# Patient Record
Sex: FEMALE | URBAN - METROPOLITAN AREA
[De-identification: names, ages, dates, MRNs, and addresses within clinical notes are randomized per-mention and may not be internally consistent; named-entity substitution may affect disease eponyms.]

---

## 2017-07-19 ENCOUNTER — APPOINTMENT (OUTPATIENT)
Dept: ULTRASOUND IMAGING | Facility: CLINIC | Age: 27
End: 2017-07-19
Attending: PHYSICIAN ASSISTANT
Payer: COMMERCIAL

## 2017-07-19 ENCOUNTER — HOSPITAL ENCOUNTER (EMERGENCY)
Facility: CLINIC | Age: 27
Discharge: HOME | End: 2017-07-19
Attending: EMERGENCY MEDICINE | Admitting: EMERGENCY MEDICINE
Payer: COMMERCIAL

## 2017-07-19 VITALS
HEART RATE: 64 BPM | RESPIRATION RATE: 18 BRPM | OXYGEN SATURATION: 100 % | DIASTOLIC BLOOD PRESSURE: 56 MMHG | TEMPERATURE: 98.1 F | SYSTOLIC BLOOD PRESSURE: 116 MMHG

## 2017-07-19 DIAGNOSIS — M54.41 ACUTE MIDLINE LOW BACK PAIN WITH RIGHT-SIDED SCIATICA: ICD-10-CM

## 2017-07-19 DIAGNOSIS — R30.0 DYSURIA: ICD-10-CM

## 2017-07-19 DIAGNOSIS — N92.6 IRREGULAR UTERINE BLEEDING: ICD-10-CM

## 2017-07-19 DIAGNOSIS — N83.209 SIMPLE OVARIAN CYST: ICD-10-CM

## 2017-07-19 LAB
ALBUMIN SERPL-MCNC: 4 G/DL (ref 3.4–5)
ALBUMIN UR-MCNC: NEGATIVE MG/DL
ALP SERPL-CCNC: 55 U/L (ref 40–150)
ALT SERPL W P-5'-P-CCNC: 33 U/L (ref 0–50)
ANION GAP SERPL CALCULATED.3IONS-SCNC: 5 MMOL/L (ref 3–14)
APPEARANCE UR: CLEAR
AST SERPL W P-5'-P-CCNC: 18 U/L (ref 0–45)
BASOPHILS # BLD AUTO: 0 10E9/L (ref 0–0.2)
BASOPHILS NFR BLD AUTO: 0.4 %
BILIRUB SERPL-MCNC: 0.3 MG/DL (ref 0.2–1.3)
BILIRUB UR QL STRIP: NEGATIVE
BUN SERPL-MCNC: 9 MG/DL (ref 7–30)
CALCIUM SERPL-MCNC: 9 MG/DL (ref 8.5–10.1)
CHLORIDE SERPL-SCNC: 109 MMOL/L (ref 94–109)
CO2 SERPL-SCNC: 27 MMOL/L (ref 20–32)
COLOR UR AUTO: NORMAL
CREAT SERPL-MCNC: 0.65 MG/DL (ref 0.52–1.04)
DIFFERENTIAL METHOD BLD: ABNORMAL
EOSINOPHIL # BLD AUTO: 0.5 10E9/L (ref 0–0.7)
EOSINOPHIL NFR BLD AUTO: 6.4 %
ERYTHROCYTE [DISTWIDTH] IN BLOOD BY AUTOMATED COUNT: 14.5 % (ref 10–15)
GFR SERPL CREATININE-BSD FRML MDRD: NORMAL ML/MIN/1.7M2
GLUCOSE SERPL-MCNC: 81 MG/DL (ref 70–99)
GLUCOSE UR STRIP-MCNC: NEGATIVE MG/DL
HCG UR QL: NEGATIVE
HCT VFR BLD AUTO: 37.8 % (ref 35–47)
HGB BLD-MCNC: 11.9 G/DL (ref 11.7–15.7)
HGB UR QL STRIP: NEGATIVE
IMM GRANULOCYTES # BLD: 0 10E9/L (ref 0–0.4)
IMM GRANULOCYTES NFR BLD: 0.3 %
KETONES UR STRIP-MCNC: NEGATIVE MG/DL
LEUKOCYTE ESTERASE UR QL STRIP: NEGATIVE
LYMPHOCYTES # BLD AUTO: 2.4 10E9/L (ref 0.8–5.3)
LYMPHOCYTES NFR BLD AUTO: 33.9 %
MCH RBC QN AUTO: 25.3 PG (ref 26.5–33)
MCHC RBC AUTO-ENTMCNC: 31.5 G/DL (ref 31.5–36.5)
MCV RBC AUTO: 80 FL (ref 78–100)
MICRO REPORT STATUS: NORMAL
MONOCYTES # BLD AUTO: 0.5 10E9/L (ref 0–1.3)
MONOCYTES NFR BLD AUTO: 7 %
NEUTROPHILS # BLD AUTO: 3.6 10E9/L (ref 1.6–8.3)
NEUTROPHILS NFR BLD AUTO: 52 %
NITRATE UR QL: NEGATIVE
NRBC # BLD AUTO: 0 10*3/UL
NRBC BLD AUTO-RTO: 0 /100
PH UR STRIP: 7 PH (ref 5–7)
PLATELET # BLD AUTO: 319 10E9/L (ref 150–450)
POTASSIUM SERPL-SCNC: 3.7 MMOL/L (ref 3.4–5.3)
PROT SERPL-MCNC: 8.1 G/DL (ref 6.8–8.8)
RBC # BLD AUTO: 4.71 10E12/L (ref 3.8–5.2)
RBC #/AREA URNS AUTO: <1 /HPF (ref 0–2)
SODIUM SERPL-SCNC: 141 MMOL/L (ref 133–144)
SP GR UR STRIP: 1.01 (ref 1–1.03)
SPECIMEN SOURCE: NORMAL
SQUAMOUS #/AREA URNS AUTO: <1 /HPF (ref 0–1)
TSH SERPL DL<=0.005 MIU/L-ACNC: 0.84 MU/L (ref 0.4–4)
URN SPEC COLLECT METH UR: NORMAL
UROBILINOGEN UR STRIP-MCNC: 0 MG/DL (ref 0–2)
WBC # BLD AUTO: 7 10E9/L (ref 4–11)
WBC #/AREA URNS AUTO: 1 /HPF (ref 0–2)
WET PREP SPEC: NORMAL

## 2017-07-19 PROCEDURE — 81025 URINE PREGNANCY TEST: CPT | Performed by: PHYSICIAN ASSISTANT

## 2017-07-19 PROCEDURE — 87491 CHLMYD TRACH DNA AMP PROBE: CPT | Performed by: PHYSICIAN ASSISTANT

## 2017-07-19 PROCEDURE — 25000128 H RX IP 250 OP 636: Performed by: PHYSICIAN ASSISTANT

## 2017-07-19 PROCEDURE — 81001 URINALYSIS AUTO W/SCOPE: CPT | Performed by: PHYSICIAN ASSISTANT

## 2017-07-19 PROCEDURE — 85025 COMPLETE CBC W/AUTO DIFF WBC: CPT | Performed by: PHYSICIAN ASSISTANT

## 2017-07-19 PROCEDURE — 96374 THER/PROPH/DIAG INJ IV PUSH: CPT

## 2017-07-19 PROCEDURE — 99285 EMERGENCY DEPT VISIT HI MDM: CPT | Mod: 25

## 2017-07-19 PROCEDURE — 80053 COMPREHEN METABOLIC PANEL: CPT | Performed by: PHYSICIAN ASSISTANT

## 2017-07-19 PROCEDURE — 84443 ASSAY THYROID STIM HORMONE: CPT | Performed by: PHYSICIAN ASSISTANT

## 2017-07-19 PROCEDURE — 87210 SMEAR WET MOUNT SALINE/INK: CPT | Performed by: PHYSICIAN ASSISTANT

## 2017-07-19 PROCEDURE — 93976 VASCULAR STUDY: CPT | Mod: XS

## 2017-07-19 PROCEDURE — 96375 TX/PRO/DX INJ NEW DRUG ADDON: CPT

## 2017-07-19 PROCEDURE — 87591 N.GONORRHOEAE DNA AMP PROB: CPT | Performed by: PHYSICIAN ASSISTANT

## 2017-07-19 RX ORDER — KETOROLAC TROMETHAMINE 30 MG/ML
30 INJECTION, SOLUTION INTRAMUSCULAR; INTRAVENOUS ONCE
Status: COMPLETED | OUTPATIENT
Start: 2017-07-19 | End: 2017-07-19

## 2017-07-19 RX ORDER — CYCLOBENZAPRINE HCL 10 MG
10 TABLET ORAL 3 TIMES DAILY PRN
Qty: 20 TABLET | Refills: 0 | Status: SHIPPED | OUTPATIENT
Start: 2017-07-19 | End: 2017-07-25

## 2017-07-19 RX ORDER — ONDANSETRON 2 MG/ML
4 INJECTION INTRAMUSCULAR; INTRAVENOUS ONCE
Status: COMPLETED | OUTPATIENT
Start: 2017-07-19 | End: 2017-07-19

## 2017-07-19 RX ORDER — METHYLPREDNISOLONE 4 MG
TABLET, DOSE PACK ORAL
Qty: 21 TABLET | Refills: 0 | Status: SHIPPED | OUTPATIENT
Start: 2017-07-19 | End: 2017-07-27

## 2017-07-19 RX ORDER — NAPROXEN 500 MG/1
500 TABLET ORAL 2 TIMES DAILY WITH MEALS
Qty: 20 TABLET | Refills: 0 | Status: SHIPPED | OUTPATIENT
Start: 2017-07-19 | End: 2017-07-29

## 2017-07-19 RX ADMIN — ONDANSETRON 4 MG: 2 INJECTION INTRAMUSCULAR; INTRAVENOUS at 12:50

## 2017-07-19 RX ADMIN — KETOROLAC TROMETHAMINE 30 MG: 30 INJECTION, SOLUTION INTRAMUSCULAR at 14:17

## 2017-07-19 ASSESSMENT — ENCOUNTER SYMPTOMS
DYSURIA: 1
VOMITING: 0
BACK PAIN: 1
ABDOMINAL PAIN: 1
NAUSEA: 1

## 2017-07-19 NOTE — ED NOTES
"Pt presents to ED with c/o back pain for about 3 weeks, was tolerable but now starting to get worse. Pt reports between the 28th of June and July 16th she was also having heavy vaginal bleeding, going through 4 pads a day, which has now subsided. States the pain is in her lower back and when she stands for along time she gets sore into her legs. Reports saw a \"bone doctor\" she \"has a spot on her back\" and recommended an MRI which she has not had. Pt is visiting from [place] and her primary care is in [place], [place]. She was prescribed pain medications by her primary doctor that she takes twice a day, the pain medicine helps for a little bit, but then starts hurting again.   "

## 2017-07-19 NOTE — ED AVS SNAPSHOT
[facility] Emergency Department    [address]    [facility] [address]    Phone:  [phone]    Fax:  [phone]                                       Kendra Vaughn   MRN: [id number]    Department:  [facility] Emergency Department   Date of Visit:  7/19/2017           After Visit Summary Signature Page     I have received my discharge instructions, and my questions have been answered. I have discussed any challenges I see with this plan with the nurse or doctor.    ..........................................................................................................................................  Patient/Patient Representative Signature      ..........................................................................................................................................  Patient Representative Print Name and Relationship to Patient    ..................................................               ................................................  Date                                            Time    ..........................................................................................................................................  Reviewed by Signature/Title    ...................................................              ..............................................  Date                                                            Time

## 2017-07-19 NOTE — DISCHARGE INSTRUCTIONS
Rest, ice to back, naproxen twice daily.   Also start steroid pack to hep with back pain and muscle relaxer as needed.     For the irregular bleeding - ultrasound shows no cause of this today. Follow up with OB GYN/PCP for further evaluation.   You do have two simple ovarian cysts, which can be intermittently painful. Follow up with OB GYN/PCP for further evaluation.      Discharge Instructions  Back Pain  You were seen today for back pain. Back pain can have many causes, but most will get better without surgery or other specific treatment. Sometimes there is a herniated ( slipped ) disc. We don t usually do MRI scans to look for these right away, since most herniated discs will get better on their own with time.  Today, we did not find any evidence that your back pain was caused by a serious condition, such as an infection, fracture, or tumor. However, sometimes symptoms develop over time and cannot be found during an emergency visit, so it is very important that you follow up with your primary doctor.  Return to the Emergency Department if:    You develop a fever with your back pain.     You have weakness or change in sensation in one or both legs.    You lose control of your bowels or bladder, or can t empty your bladder.    Your pain gets much worse.     Follow-up with your doctor:    Unless your pain has completely gone away, please make an appointment with your doctor within one week.  You may need further management of your back pain, such as more pain medication, imaging such as an X-ray or MRI, or physical therapy.    What can I do to help myself?    Remain Active -- People are often afraid that they will hurt their back further or delay recovery by remaining active, but this is one of the best things you can do for your back. In fact, prolonged bed rest is not recommended. Studies have shown that people with low back pain recover faster when they remain active. Movement helps to bring blood flow to the  muscles and relieve muscle spasms as well as preventing loss of muscle strength.    Heat -- Using a heating pad can help with low back pain during the first few weeks. Do not sleep with a heating pad, as you can be burned.     Pain medications - You may take a pain medication such as Tylenol  (acetaminophen), Advil , Nuprin  (ibuprofen) or Aleve  (naproxen).  If you have been given a narcotic such as Vicodin  (hydrocodone with acetaminophen), Percocet  (oxycodone with acetaminophen), codeine, or a muscle relaxant such as Flexeril  (cyclobenzaprine) or Soma  (carisoprodol), do not drive for four hours after you have taken it. If the narcotic contains Tylenol  (acetaminophen), do not take Tylenol  with it. All narcotics will cause constipation, so eat a high fiber diet.   If you were given a prescription for medicine here today, be sure to read all of the information (including the package insert) that comes with your prescription.  This will include important information about the medicine, its side effects, and any warnings that you need to know about.  The pharmacist who fills the prescription can provide more information and answer questions you may have about the medicine.  If you have questions or concerns that the pharmacist cannot address, please call or return to the Emergency Department.

## 2017-07-19 NOTE — ED AVS SNAPSHOT
[facility] Emergency Department    [address]    [facility] [address]    Phone:  [phone]    Fax:  [phone]                                       Kendra Vaughn   MRN: [id number]    Department:  [facility] Emergency Department   Date of Visit:  7/19/2017           Patient Information     Date Of Birth          1990        Your diagnoses for this visit were:     Acute midline low back pain with right-sided sciatica     Dysuria     Irregular uterine bleeding, not bleeding currently     Simple ovarian cyst, bilateral        You were seen by Ronni Morales MD and Jane Win PA-C.      Follow-up Information     Follow up with [facility] Emergency Department.    Specialty:  EMERGENCY MEDICINE    Why:  If symptoms worsen    Contact information:    [address]  [facility] [address] [phone]        Follow up with Other Clinic, Md In 2 days.    Specialty:  Clinic    Contact information:               Discharge Instructions       Rest, ice to back, naproxen twice daily.   Also start steroid pack to hep with back pain and muscle relaxer as needed.     For the irregular bleeding - ultrasound shows no cause of this today. Follow up with OB GYN/PCP for further evaluation.   You do have two simple ovarian cysts, which can be intermittently painful. Follow up with OB GYN/PCP for further evaluation.      Discharge Instructions  Back Pain  You were seen today for back pain. Back pain can have many causes, but most will get better without surgery or other specific treatment. Sometimes there is a herniated ( slipped ) disc. We don t usually do MRI scans to look for these right away, since most herniated discs will get better on their own with time.  Today, we did not find any evidence that your back pain was caused by a serious condition, such as an infection, fracture, or tumor. However, sometimes symptoms develop over time  and cannot be found during an emergency visit, so it is very important that you follow up with your primary doctor.  Return to the Emergency Department if:    You develop a fever with your back pain.     You have weakness or change in sensation in one or both legs.    You lose control of your bowels or bladder, or can t empty your bladder.    Your pain gets much worse.     Follow-up with your doctor:    Unless your pain has completely gone away, please make an appointment with your doctor within one week.  You may need further management of your back pain, such as more pain medication, imaging such as an X-ray or MRI, or physical therapy.    What can I do to help myself?    Remain Active -- People are often afraid that they will hurt their back further or delay recovery by remaining active, but this is one of the best things you can do for your back. In fact, prolonged bed rest is not recommended. Studies have shown that people with low back pain recover faster when they remain active. Movement helps to bring blood flow to the muscles and relieve muscle spasms as well as preventing loss of muscle strength.    Heat -- Using a heating pad can help with low back pain during the first few weeks. Do not sleep with a heating pad, as you can be burned.     Pain medications - You may take a pain medication such as Tylenol  (acetaminophen), Advil , Nuprin  (ibuprofen) or Aleve  (naproxen).  If you have been given a narcotic such as Vicodin  (hydrocodone with acetaminophen), Percocet  (oxycodone with acetaminophen), codeine, or a muscle relaxant such as Flexeril  (cyclobenzaprine) or Soma  (carisoprodol), do not drive for four hours after you have taken it. If the narcotic contains Tylenol  (acetaminophen), do not take Tylenol  with it. All narcotics will cause constipation, so eat a high fiber diet.   If you were given a prescription for medicine here today, be sure to read all of the information (including the package  insert) that comes with your prescription.  This will include important information about the medicine, its side effects, and any warnings that you need to know about.  The pharmacist who fills the prescription can provide more information and answer questions you may have about the medicine.  If you have questions or concerns that the pharmacist cannot address, please call or return to the Emergency Department.         Discharge References/Attachments     BACK PAIN W/ SCIATICA (ENGLISH)    DYSFUNCTIONAL UTERINE BLEEDING (ENGLISH)      24 Hour Appointment Hotline       To make an appointment at any [facility], call [phone] ([phone]). If you don't have a family doctor or clinic, we will help you find one. [facility] clinics are conveniently located to serve the needs of you and your family.             Review of your medicines      START taking        Dose / Directions Last dose taken    cyclobenzaprine 10 MG tablet   Commonly known as:  FLEXERIL   Dose:  10 mg   Quantity:  20 tablet        Take 1 tablet (10 mg) by mouth 3 times daily as needed for muscle spasms   Refills:  0        methylPREDNISolone 4 MG tablet   Commonly known as:  MEDROL DOSEPAK   Quantity:  21 tablet        Follow package instructions   Refills:  0        naproxen 500 MG tablet   Commonly known as:  NAPROSYN   Dose:  500 mg   Quantity:  20 tablet        Take 1 tablet (500 mg) by mouth 2 times daily (with meals) for 10 days   Refills:  0          Our records show that you are taking the medicines listed below. If these are incorrect, please call your family doctor or clinic.        Dose / Directions Last dose taken    albuterol 108 (90 BASE) MCG/ACT Inhaler   Commonly known as:  PROAIR HFA/PROVENTIL HFA/VENTOLIN HFA   Dose:  2 puff        Inhale 2 puffs into the lungs 2 times daily as needed for shortness of breath / dyspnea or wheezing   Refills:  0        cetirizine 10 MG tablet   Commonly known as:  zyrTEC   Dose:  10 mg         Take 10 mg by mouth At Bedtime   Refills:  0        Cholecalciferol 5000 UNITS Tabs   Dose:  1 tablet        Take 1 tablet by mouth daily   Refills:  0        FLONASE ALLERGY RELIEF 50 MCG/ACT spray   Dose:  2 spray   Generic drug:  fluticasone        Spray 2 sprays into both nostrils daily   Refills:  0        fluticasone 220 MCG/ACT Inhaler   Commonly known as:  FLOVENT HFA   Dose:  2 puff        Inhale 2 puffs into the lungs 2 times daily   Refills:  0        guaiFENesin-codeine 100-10 MG/5ML Soln solution   Commonly known as:  ROBITUSSIN AC   Dose:  2 tsp.   Quantity:  120 mL        Take 10 mLs by mouth every 4 hours as needed for cough   Refills:  0        ibuprofen 600 MG tablet   Commonly known as:  ADVIL/MOTRIN   Dose:  600 mg   Quantity:  30 tablet        Take 1 tablet (600 mg) by mouth every 6 hours as needed for moderate pain   Refills:  0        indomethacin 50 MG capsule   Commonly known as:  INDOCIN   Dose:  50 mg   Quantity:  20 capsule        Take 1 capsule (50 mg) by mouth every 8 hours as needed for moderate pain   Refills:  0        loratadine 10 MG tablet   Commonly known as:  CLARITIN   Dose:  10 mg        Take 10 mg by mouth daily   Refills:  0        montelukast 10 MG tablet   Commonly known as:  SINGULAIR   Dose:  10 mg        Take 10 mg by mouth At Bedtime   Refills:  0        omeprazole 20 MG tablet   Dose:  20 mg        Take 20 mg by mouth daily   Refills:  0                Prescriptions were sent or printed at these locations (3 Prescriptions)                   Other Prescriptions                Printed at Department/Unit printer (3 of 3)         methylPREDNISolone (MEDROL DOSEPAK) 4 MG tablet               cyclobenzaprine (FLEXERIL) 10 MG tablet               naproxen (NAPROSYN) 500 MG tablet                Procedures and tests performed during your visit     CBC with platelets differential    Chlamydia trachomatis PCR    Comprehensive metabolic panel    HCG qualitative urine    IV  access    Neisseria gonorrhoeae PCR    TSH with free T4 reflex    UA with Microscopic    US Pelvic Complete w Transvaginal & Abd/Pel Duplex Limited    Wet prep      Orders Needing Specimen Collection     None      Pending Results     Date and Time Order Name Status Description    7/19/2017 1337 Neisseria gonorrhoeae PCR In process     7/19/2017 1337 Chlamydia trachomatis PCR In process     7/19/2017 1244 TSH with free T4 reflex In process             Pending Culture Results     Date and Time Order Name Status Description    7/19/2017 1337 Neisseria gonorrhoeae PCR In process     7/19/2017 1337 Chlamydia trachomatis PCR In process             Pending Results Instructions     If you had any lab results that were not finalized at the time of your Discharge, you can call the ED Lab Result RN at [phone]. You will be contacted by this team for any positive Lab results or changes in treatment. The nurses are available 7 days a week from 10A to 6:30P.  You can leave a message 24 hours per day and they will return your call.        Test Results From Your Hospital Stay        7/19/2017  1:11 PM      Component Results     Component Value Ref Range & Units Status    Sodium 141 133 - 144 mmol/L Final    Potassium 3.7 3.4 - 5.3 mmol/L Final    Chloride 109 94 - 109 mmol/L Final    Carbon Dioxide 27 20 - 32 mmol/L Final    Anion Gap 5 3 - 14 mmol/L Final    Glucose 81 70 - 99 mg/dL Final    Urea Nitrogen 9 7 - 30 mg/dL Final    Creatinine 0.65 0.52 - 1.04 mg/dL Final    GFR Estimate >90  Non  GFR Calc   >60 mL/min/1.7m2 Final    GFR Estimate If Black >90   GFR Calc   >60 mL/min/1.7m2 Final    Calcium 9.0 8.5 - 10.1 mg/dL Final    Bilirubin Total 0.3 0.2 - 1.3 mg/dL Final    Albumin 4.0 3.4 - 5.0 g/dL Final    Protein Total 8.1 6.8 - 8.8 g/dL Final    Alkaline Phosphatase 55 40 - 150 U/L Final    ALT 33 0 - 50 U/L Final    AST 18 0 - 45 U/L Final         7/19/2017  2:29 PM      Narrative      PELVIC ULTRASOUND TRANSABDOMINAL IMAGING AND TRANSVAGINAL IMAGING   7/19/2017 1:44 PM    HISTORY: Pain and irregular bleeding.    COMPARISON: None.    TECHNIQUE: Transabdominal imaging was performed. Transvaginal imaging  was also performed to better evaluate the endometrium.    FINDING: The uterus measures 6.9 x 4.6 x 4.4 cm. It demonstrates  normal echogenicity with no myometrial abnormality seen. The  endometrium is normal measuring 0.5 cm. The right ovary contains a 2.0  cm cyst. The left ovary contains a 2.7 cm cyst. Normal blood flow is  seen in both ovaries. No adnexal pathology is seen. There is no free  fluid in the cul-de-sac.        Impression     IMPRESSION: Simple appearing bilateral ovarian cysts.    KILLIAN STALLINGS MD         7/19/2017  1:27 PM      Component Results     Component Value Ref Range & Units Status    HCG Qual Urine Negative NEG Final         7/19/2017  1:33 PM      Component Results     Component Value Ref Range & Units Status    Color Urine Straw  Final    Appearance Urine Clear  Final    Glucose Urine Negative NEG mg/dL Final    Bilirubin Urine Negative NEG Final    Ketones Urine Negative NEG mg/dL Final    Specific Gravity Urine 1.010 1.003 - 1.035 Final    Blood Urine Negative NEG Final    pH Urine 7.0 5.0 - 7.0 pH Final    Protein Albumin Urine Negative NEG mg/dL Final    Urobilinogen mg/dL 0.0 0.0 - 2.0 mg/dL Final    Nitrite Urine Negative NEG Final    Leukocyte Esterase Urine Negative NEG Final    Source Midstream Urine  Final    WBC Urine 1 0 - 2 /HPF Final    RBC Urine <1 0 - 2 /HPF Final    Squamous Epithelial /HPF Urine <1 0 - 1 /HPF Final         7/19/2017  1:17 PM      Component Results     Component Value Ref Range & Units Status    WBC 7.0 4.0 - 11.0 10e9/L Final    RBC Count 4.71 3.8 - 5.2 10e12/L Final    Hemoglobin 11.9 11.7 - 15.7 g/dL Final    Hematocrit 37.8 35.0 - 47.0 % Final    MCV 80 78 - 100 fl Final    MCH 25.3 (L) 26.5 - 33.0 pg Final    MCHC 31.5 31.5 -  36.5 g/dL Final    RDW 14.5 10.0 - 15.0 % Final    Platelet Count 319 150 - 450 10e9/L Final    Diff Method Automated Method  Final    % Neutrophils 52.0 % Final    % Lymphocytes 33.9 % Final    % Monocytes 7.0 % Final    % Eosinophils 6.4 % Final    % Basophils 0.4 % Final    % Immature Granulocytes 0.3 % Final    Nucleated RBCs 0 0 /100 Final    Absolute Neutrophil 3.6 1.6 - 8.3 10e9/L Final    Absolute Lymphocytes 2.4 0.8 - 5.3 10e9/L Final    Absolute Monocytes 0.5 0.0 - 1.3 10e9/L Final    Absolute Eosinophils 0.5 0.0 - 0.7 10e9/L Final    Absolute Basophils 0.0 0.0 - 0.2 10e9/L Final    Abs Immature Granulocytes 0.0 0 - 0.4 10e9/L Final    Absolute Nucleated RBC 0.0  Final         7/19/2017  3:03 PM      Component Results     Component    Specimen Description    Vagina    Wet Prep    Few PMNs seen  No Trichomonas seen  No yeast seen  No clue cells seen      Micro Report Status    FINAL 07/19/2017 7/19/2017  2:40 PM         7/19/2017  2:40 PM         7/19/2017  3:05 PM                Clinical Quality Measure: Blood Pressure Screening     Your blood pressure was checked while you were in the emergency department today. The last reading we obtained was  BP: 106/63 . Please read the guidelines below about what these numbers mean and what you should do about them.  If your systolic blood pressure (the top number) is less than 120 and your diastolic blood pressure (the bottom number) is less than 80, then your blood pressure is normal. There is nothing more that you need to do about it.  If your systolic blood pressure (the top number) is 120-139 or your diastolic blood pressure (the bottom number) is 80-89, your blood pressure may be higher than it should be. You should have your blood pressure rechecked within a year by a primary care provider.  If your systolic blood pressure (the top number) is 140 or greater or your diastolic blood pressure (the bottom number) is 90 or greater, you may have high blood  "pressure. High blood pressure is treatable, but if left untreated over time it can put you at risk for heart attack, stroke, or kidney failure. You should have your blood pressure rechecked by a primary care provider within the next 4 weeks.  If your provider in the emergency department today gave you specific instructions to follow-up with your doctor or provider even sooner than that, you should follow that instruction and not wait for up to 4 weeks for your follow-up visit.        Thank you for choosing [facility]       Thank you for choosing [facility] for your care. Our goal is always to provide you with excellent care. Hearing back from our patients is one way we can continue to improve our services. Please take a few minutes to complete the written survey that you may receive in the mail after you visit with us. Thank you!        [employer] Information     [employer] lets you send messages to your doctor, view your test results, renew your prescriptions, schedule appointments and more. To sign up, go to [email or web address] . Click on \"Log in\" on the left side of the screen, which will take you to the Welcome page. Then click on \"Sign up Now\" on the right side of the page.     You will be asked to enter the access code listed below, as well as some personal information. Please follow the directions to create your username and password.     Your access code is: [email or web address]  Expires: 10/17/2017  3:12 PM     Your access code will  in 90 days. If you need help or a new code, please call your [facility] clinic or [phone].        Care EveryWhere ID     This is your Care EveryWhere ID. This could be used by other organizations to access your [facility] medical records  [id number]        Equal Access to Services     VIVI LOVE : al Patel, jm richardson. So [facility] [phone].    ATENCIÓN: Si habla español, tiene a lebron " disposición servicios gratuitos de asistencia lingüística. Llclau al [phone].    We comply with applicable federal civil rights laws and [place] laws. We do not discriminate on the basis of race, color, national origin, age, disability sex, sexual orientation or gender identity.            After Visit Summary       This is your record. Keep this with you and show to your community pharmacist(s) and doctor(s) at your next visit.

## 2017-07-19 NOTE — ED PROVIDER NOTES
History     Chief Complaint:  Back pain and vaginal bleeding    HPI   History obtained from family due to the patient not speaking english.     Kendra Vaughn is a 27 year old female who presents with back pain and dysuria. For around 3 weeks, the patient has been experiencing upper and lower back pain that radiates into both legs, but is worse on the right. She has seen her PCP and an orthopedist for this and was prescribed pain medication. She was also told that she needed to have an MRI of her back, but has not done this yet. Since that time, the pain has become more severe and the medication she was prescribed has not been helping. No bowel or bladder incontinence.     From 6/28-7/16, the patient was experiencing vaginal bleeding. She states she was passing clots and going through 4 pads a day. Three days ago, the bleeding resolved, but she began experiencing dysuria. She has also been experiencing intermittent abdominal pain and nausea. No vomiting. The patient is on birth control and does not have her menstrual cycle. She denies any chance of pregnancy, though is sexually active.      Allergies:  No known drug allergies.     Medications:    Cholecalciferol  Omeprazole  Albuterol inhaler  Fluticasone inhaler  Singulair  Zyrtec  Claritin    Past Medical History:    Asthma    Past Surgical History:    History reviewed. No pertinent past surgical history.    Family History:    History reviewed. No pertinent family history.    Social History:  Marital Status:   Presents to the ED with her family  Tobacco Use: negative  Alcohol Use: negative    Review of Systems   Gastrointestinal: Positive for abdominal pain and nausea. Negative for vomiting.   Genitourinary: Positive for dysuria and vaginal bleeding. Negative for enuresis.   Musculoskeletal: Positive for back pain.   All other systems reviewed and are negative.    Physical Exam     Patient Vitals for the past 24 hrs:   BP Temp Temp src Pulse Heart Rate Resp  SpO2   07/19/17 1525 116/56 - - 64 - - -   07/19/17 1305 106/63 - - 67 - - 100 %   07/19/17 1205 111/80 98.1  F (36.7  C) Oral 72 72 18 100 %          Physical Exam  Nursing note and vitals reviewed.     GENERAL: Alert, mild distress, non toxic appearing.   HEENT: Normal conjunctiva. No scleral icterus.   NECK: Supple, normal range of motion.  CARDIAC: Normal rate and regular rhythm. Normal heart sounds. No murmurs, rubs, or gallops appreciated. Intact distal pedal pulses 2+ and symmetric.   PULMONARY: CTA bilaterally. Normal breath sounds. No wheezing, crackles, or rhonchi appreciated.   ABDOMEN: Soft, non-tender, non-distended. No rebound or guarding.   : Normal external genitalia. Very small amount of clear discharge in vaginal vault, no blood. Cervical normal. No CMT. No adnexal tenderness bilaterally.   NEURO: Alert and oriented. No cranial nerve deficit. Sensation intact throughout including saddle area. 5/5 strength of bilateral lower extremities. Bilateral patellar and achilles reflexes intact 2+. Normal gait.   MUSCULOSKELETAL: No peripheral edema. No CVA tenderness. No midline tenderness over thoracic, lumbar, and sacral spine. Mild tenderness over bilateral paraspinous musculature of lumbar spine.   SKIN: Skin is warm and dry. No rashes. No pallor or jaundice. No erythema, edema, or warmth noted over spine.  PSYCH: Normal affect and mood.     Emergency Department Course   Imaging:  Radiographic findings were communicated with the patient who voiced understanding of the findings.    US Pelvic Complete w Transvaginal & Abd/Pel Duplex Limited  Simple appearing bilateral ovarian cysts.  Report per radiology.     Laboratory:  Wet Prep: few PMN's seen.  No Trichomonas seen.  No clue cells seen. No yeast seen.  Chlamydia trachomatis: pending  Neisseria gonorrhea: pending  HCG qualitative: negative  UA: Clear straw urine    CMP: WNL (Creatinine 0.65)  CBC:  WBC 7.0, HGB 11.9,   TSH with free T4 reflex:  0.84    Interventions:  (1250) Zofran, 4 mg, IV injection  (1417) Toradol, 30 mg, IV injection    Emergency Department Course:  Nursing notes and vitals reviewed.  I performed an exam of the patient as documented above.   A peripheral IV was established. Blood was drawn from the patient. This was sent for laboratory testing, findings above.   Urine sample was obtained and sent for laboratory analysis, findings above.  The patient was sent for a pelvic ultrasound while in the emergency department, findings above.   (1515) I rechecked the patient and updated her on her results.  Findings and plan explained to the patient. Patient discharged home with instructions regarding supportive care, medications, and reasons to return. The importance of close follow-up was reviewed. The patient was prescribed Medrol Dosepak, flexeril, and naproxen.  I personally reviewed the laboratory results with the patient and answered all related questions prior to discharge.     Impression & Plan    Medical Decision Making:  This is a 27 year old female who presents with concern for low back pain for the past three weeks. There is no history of recent trauma, thus x-rays were not obtained due to low likelihood of fracture or subluxation. She has been seen for this previously with plans to have MRI but has not had this done yet. The patient did not have any high risk features, including focal neurologic symptoms, saddle anesthesia, or bowel or bladder dysfunction. There is no clinical evidence of acute cord compression, cauda equina syndrome, discitis, spinal hematoma or epidural abscess, or serious acute intraabdominal pathology for the patient's presenting complaint. The patient was neurovascularly intact on physical exam and findings at this time are consistent with musculoskeletal strain with a sciatica component. The patient will be discharged with naproxen, muscle relaxer and steroid pack to use as directed. The patient was counseled  regarding the need for supportive care, including ice/heat to the back and avoiding heavy lifting, bending, or twisting.     She also reports irregular vaginal bleeding for a period, but this has since resolved. No evidence of anemia. Thyroid function normal. US pelvis shows small bilateral simple ovarian cysts, but no signs of fibroids or any other acute abnormalities to explain irregular bleeding. Given this has resolved and she is stable will have her follow up with PCP for further evaluation. Finally, she commented on dysuria for past few days. Urine is negative for infection. No concern for STDs, though GC cultures pending. Wet prep is negative. Advised her to see PCP if not improving over the next few days.     Reviewed reasons to return to ED, including worsening symptoms, high fevers, significant vaginal bleeding, abdominal pain, loss of bowel or bladder control, focal weakness/numbness, or any new concerns. The patient was in agreement with plan and discharged in satisfactory condition with all questions answered.      Diagnosis:    ICD-10-CM   1. Acute midline low back pain with right-sided sciatica M54.41   2. Dysuria R30.0   3. Irregular uterine bleeding, not bleeding currently N92.6   4. Simple ovarian cyst, bilateral N83.209     Disposition:  Discharge to home.    Discharge Medications:  Discharge Medication List as of 7/19/2017  3:12 PM      START taking these medications    Details   methylPREDNISolone (MEDROL DOSEPAK) 4 MG tablet Follow package instructions, Disp-21 tablet, R-0, Local Print      cyclobenzaprine (FLEXERIL) 10 MG tablet Take 1 tablet (10 mg) by mouth 3 times daily as needed for muscle spasms, Disp-20 tablet, R-0, Local Print      naproxen (NAPROSYN) 500 MG tablet Take 1 tablet (500 mg) by mouth 2 times daily (with meals) for 10 days, Disp-20 tablet, R-0, Local Print             Jerzy CONRAD, am serving as a scribe on 7/19/2017 at 12:14 PM to personally document services performed  by Jane Win PA-C based on my observations and the provider's statements to me.       Jane Win PA-C  07/22/17 1506

## 2017-07-20 LAB
C TRACH DNA SPEC QL NAA+PROBE: NORMAL
N GONORRHOEA DNA SPEC QL NAA+PROBE: NORMAL
SPECIMEN SOURCE: NORMAL
SPECIMEN SOURCE: NORMAL

## 2017-07-27 ENCOUNTER — HOSPITAL ENCOUNTER (EMERGENCY)
Facility: CLINIC | Age: 27
Discharge: HOME | End: 2017-07-28
Attending: EMERGENCY MEDICINE | Admitting: EMERGENCY MEDICINE
Payer: COMMERCIAL

## 2017-07-27 DIAGNOSIS — M54.50 CHRONIC LOW BACK PAIN WITHOUT SCIATICA, UNSPECIFIED BACK PAIN LATERALITY: ICD-10-CM

## 2017-07-27 DIAGNOSIS — G89.29 CHRONIC LOW BACK PAIN WITHOUT SCIATICA, UNSPECIFIED BACK PAIN LATERALITY: ICD-10-CM

## 2017-07-27 DIAGNOSIS — R10.84 ABDOMINAL PAIN, GENERALIZED: ICD-10-CM

## 2017-07-27 PROCEDURE — 99284 EMERGENCY DEPT VISIT MOD MDM: CPT

## 2017-07-27 NOTE — ED AVS SNAPSHOT
[facility] Emergency Department    [address]    [facility] [address]    Phone:  [phone]    Fax:  [phone]                                       Kendra Vaughn   MRN: [id number]    Department:  [facility] Emergency Department   Date of Visit:  7/27/2017           After Visit Summary Signature Page     I have received my discharge instructions, and my questions have been answered. I have discussed any challenges I see with this plan with the nurse or doctor.    ..........................................................................................................................................  Patient/Patient Representative Signature      ..........................................................................................................................................  Patient Representative Print Name and Relationship to Patient    ..................................................               ................................................  Date                                            Time    ..........................................................................................................................................  Reviewed by Signature/Title    ...................................................              ..............................................  Date                                                            Time

## 2017-07-27 NOTE — ED AVS SNAPSHOT
[facility] Emergency Department    [address]    [facility] [address]    Phone:  [phone]    Fax:  [phone]                                       Kendra Vaughn   MRN: [id number]    Department:  [facility] Emergency Department   Date of Visit:  7/27/2017           Patient Information     Date Of Birth          1990        Your diagnoses for this visit were:     Abdominal pain, generalized     Chronic low back pain without sciatica, unspecified back pain laterality        You were seen by Kulwant Lacy MD.      Follow-up Information     Follow up with PCP as needed.        Discharge Instructions       Discharge Instructions  Abdominal Pain    Abdominal pain can be caused by many things. Your evaluation today does not show the exact cause for your pain. Your doctor today has decided that it is unlikely your pain is due to a life threatening problem, or a problem requiring surgery or hospital admission. Sometimes those problems cannot be found right away, so it is very important that you follow up as directed.  Sometimes only the changes which occur over time allow the cause of your pain to be found.    Return to the Emergency Department for a recheck in 8-12 hours if your pain continues.  If your pain gets worse, changes in location, or feels different, return to the Emergency Department right away.    ADULTS:  Return to the Emergency Department right away if:      You get an oral temperature above 102oF or as directed by your doctor.    You have blood in your stools (bright red or black, tarry stools).    You keep throwing up or can t drink liquids.    You see blood when you throw up.    You can t have a bowel movement or you can t pass gas.    Your stomach gets bloated or bigger.    Your skin or the whites of your eyes look yellow.    You faint.    You have bloody, frequent or painful urination.    You have new symptoms or anything that worries you.      MORE  "INFORMATION:    Appendicitis:  A possible cause of abdominal pain in any person who still has their appendix is acute appendicitis. Appendicitis is often hard to diagnose.  Testing does not always rule out early appendicitis or other causes of abdominal pain. Close follow-up with your doctor and re-evaluations may be needed to figure out the reason for your abdominal pain.    Follow-up:  It is very important that you make an appointment with your clinic and go to the appointment.  If you do not follow-up with your primary doctor, it may result in missing an important development which could result in permanent injury or disability and/or lasting pain.  If there is any problem keeping your appointment, call your doctor or return to the Emergency Department.    Medications:  Take your medications as directed by your doctor today.  Before using over-the-counter medications, ask your doctor and make sure to take the medications as directed.  If you have any questions about medications, ask your doctor.    Diet:  Resume your normal diet as much as possible, but do not eat fried, fatty or spicy foods while you have pain.  Do not drink alcohol or have caffeine.  Do not smoke tobacco.    Probiotics: If you have been given an antibiotic, you may want to also take a probiotic pill or eat yogurt with live cultures. Probiotics have \"good bacteria\" to help your intestines stay healthy. Studies have shown that probiotics help prevent diarrhea and other intestine problems (including C. diff infection) when you take antibiotics. You can buy these without a prescription in the pharmacy section of the store.     If you were given a prescription for medicine here today, be sure to read all of the information (including the package insert) that comes with your prescription.  This will include important information about the medicine, its side effects, and any warnings that you need to know about.  The pharmacist who fills the " prescription can provide more information and answer questions you may have about the medicine.  If you have questions or concerns that the pharmacist cannot address, please call or return to the Emergency Department.     Remember that you can always come back to the Emergency Department if you are not able to see your regular doctor in the amount of time listed above, if you get any new symptoms, or if there is anything that worries you.         24 Hour Appointment Hotline       To make an appointment at any [facility], call [phone] ([phone]). If you don't have a family doctor or clinic, we will help you find one. [facility] are conveniently located to serve the needs of you and your family.             Review of your medicines      START taking        Dose / Directions Last dose taken    ondansetron 4 MG ODT tab   Commonly known as:  ZOFRAN ODT   Dose:  4 mg   Quantity:  12 tablet        Take 1 tablet (4 mg) by mouth every 6 hours as needed for nausea   Refills:  0          Our records show that you are taking the medicines listed below. If these are incorrect, please call your family doctor or clinic.        Dose / Directions Last dose taken    albuterol 108 (90 BASE) MCG/ACT Inhaler   Commonly known as:  PROAIR HFA/PROVENTIL HFA/VENTOLIN HFA   Dose:  2 puff        Inhale 2 puffs into the lungs 2 times daily as needed for shortness of breath / dyspnea or wheezing   Refills:  0        cetirizine 10 MG tablet   Commonly known as:  zyrTEC   Dose:  10 mg        Take 10 mg by mouth At Bedtime   Refills:  0        Cholecalciferol 5000 UNITS Tabs   Dose:  1 tablet        Take 1 tablet by mouth daily   Refills:  0        FLONASE ALLERGY RELIEF 50 MCG/ACT spray   Dose:  2 spray   Generic drug:  fluticasone        Spray 2 sprays into both nostrils daily   Refills:  0        fluticasone 220 MCG/ACT Inhaler   Commonly known as:  FLOVENT HFA   Dose:  2 puff        Inhale 2 puffs into the lungs 2 times  daily   Refills:  0        indomethacin 50 MG capsule   Commonly known as:  INDOCIN   Dose:  50 mg   Quantity:  20 capsule        Take 1 capsule (50 mg) by mouth every 8 hours as needed for moderate pain   Refills:  0        loratadine 10 MG tablet   Commonly known as:  CLARITIN   Dose:  10 mg        Take 10 mg by mouth daily   Refills:  0        montelukast 10 MG tablet   Commonly known as:  SINGULAIR   Dose:  10 mg        Take 10 mg by mouth At Bedtime   Refills:  0        naproxen 500 MG tablet   Commonly known as:  NAPROSYN   Dose:  500 mg   Quantity:  20 tablet        Take 1 tablet (500 mg) by mouth 2 times daily (with meals) for 10 days   Refills:  0        omeprazole 20 MG tablet   Dose:  20 mg        Take 20 mg by mouth daily   Refills:  0                Prescriptions were sent or printed at these locations (1 Prescription)                   Other Prescriptions                Printed at Department/Unit printer (1 of 1)         ondansetron (ZOFRAN ODT) 4 MG ODT tab                Procedures and tests performed during your visit     Abdomen XR, 2 vw, flat and upright    CBC + differential      Orders Needing Specimen Collection     None      Pending Results     Date and Time Order Name Status Description    7/27/2017 2355 Abdomen XR, 2 vw, flat and upright Preliminary             Pending Culture Results     No orders found for last 3 day(s).            Pending Results Instructions     If you had any lab results that were not finalized at the time of your Discharge, you can call the ED Lab Result RN at [phone]. You will be contacted by this team for any positive Lab results or changes in treatment. The nurses are available 7 days a week from 10A to 6:30P.  You can leave a message 24 hours per day and they will return your call.        Test Results From Your Hospital Stay        7/28/2017 12:52 AM      Component Results     Component Value Ref Range & Units Status    WBC 10.9 4.0 - 11.0 10e9/L Final    RBC  Count 4.31 3.8 - 5.2 10e12/L Final    Hemoglobin 10.7 (L) 11.7 - 15.7 g/dL Final    Hematocrit 34.5 (L) 35.0 - 47.0 % Final    MCV 80 78 - 100 fl Final    MCH 24.8 (L) 26.5 - 33.0 pg Final    MCHC 31.0 (L) 31.5 - 36.5 g/dL Final    RDW 14.4 10.0 - 15.0 % Final    Platelet Count 305 150 - 450 10e9/L Final    Diff Method Automated Method  Final    % Neutrophils 51.4 % Final    % Lymphocytes 37.5 % Final    % Monocytes 7.6 % Final    % Eosinophils 2.9 % Final    % Basophils 0.4 % Final    % Immature Granulocytes 0.2 % Final    Nucleated RBCs 0 0 /100 Final    Absolute Neutrophil 5.6 1.6 - 8.3 10e9/L Final    Absolute Lymphocytes 4.1 0.8 - 5.3 10e9/L Final    Absolute Monocytes 0.8 0.0 - 1.3 10e9/L Final    Absolute Eosinophils 0.3 0.0 - 0.7 10e9/L Final    Absolute Basophils 0.0 0.0 - 0.2 10e9/L Final    Abs Immature Granulocytes 0.0 0 - 0.4 10e9/L Final    Absolute Nucleated RBC 0.0  Final         7/28/2017 12:30 AM      Narrative     XR ABDOMEN 2 VW  7/28/2017 12:22 AM      HISTORY: Abdominal pain.     COMPARISON: None.        Impression     IMPRESSION: Supine and upright views. The bowel gas pattern is  unremarkable. No free air or air-fluid levels. Large amount of stool  in the right colon. Small amount of stool in the remainder of the  colon.                Clinical Quality Measure: Blood Pressure Screening     Your blood pressure was checked while you were in the emergency department today. The last reading we obtained was  BP: 111/69 . Please read the guidelines below about what these numbers mean and what you should do about them.  If your systolic blood pressure (the top number) is less than 120 and your diastolic blood pressure (the bottom number) is less than 80, then your blood pressure is normal. There is nothing more that you need to do about it.  If your systolic blood pressure (the top number) is 120-139 or your diastolic blood pressure (the bottom number) is 80-89, your blood pressure may be higher  "than it should be. You should have your blood pressure rechecked within a year by a primary care provider.  If your systolic blood pressure (the top number) is 140 or greater or your diastolic blood pressure (the bottom number) is 90 or greater, you may have high blood pressure. High blood pressure is treatable, but if left untreated over time it can put you at risk for heart attack, stroke, or kidney failure. You should have your blood pressure rechecked by a primary care provider within the next 4 weeks.  If your provider in the emergency department today gave you specific instructions to follow-up with your doctor or provider even sooner than that, you should follow that instruction and not wait for up to 4 weeks for your follow-up visit.        Thank you for choosing [facility]       Thank you for choosing [facility] for your care. Our goal is always to provide you with excellent care. Hearing back from our patients is one way we can continue to improve our services. Please take a few minutes to complete the written survey that you may receive in the mail after you visit with us. Thank you!        [facility] Information     [facility] lets you send messages to your doctor, view your test results, renew your prescriptions, schedule appointments and more. To sign up, go to [email or web address] . Click on \"Log in\" on the left side of the screen, which will take you to the Welcome page. Then click on \"Sign up Now\" on the right side of the page.     You will be asked to enter the access code listed below, as well as some personal information. Please follow the directions to create your username and password.     Your access code is: [email or web address]  Expires: 10/17/2017  3:12 PM     Your access code will  in 90 days. If you need help or a new code, please call your [facility] clinic or [phone].        Care EveryWhere ID     This is your Care EveryWhere ID. This could be used by other organizations to access your " [facility] medical records  [id number]        Equal Access to Services     VIVI LOVE : Hadii aries Kidd, al leos, darrell chambers, jm luna. So [facility] [phone].    ATENCIÓN: Si habla español, tiene a lebron disposición servicios gratuitos de asistencia lingüística. Llame al [phone].    We comply with applicable federal civil rights laws and [place] laws. We do not discriminate on the basis of race, color, national origin, age, disability sex, sexual orientation or gender identity.            After Visit Summary       This is your record. Keep this with you and show to your community pharmacist(s) and doctor(s) at your next visit.

## 2017-07-28 ENCOUNTER — APPOINTMENT (OUTPATIENT)
Dept: GENERAL RADIOLOGY | Facility: CLINIC | Age: 27
End: 2017-07-28
Attending: EMERGENCY MEDICINE
Payer: COMMERCIAL

## 2017-07-28 VITALS
WEIGHT: 135 LBS | HEART RATE: 82 BPM | BODY MASS INDEX: 25.51 KG/M2 | TEMPERATURE: 99.2 F | RESPIRATION RATE: 18 BRPM | SYSTOLIC BLOOD PRESSURE: 111 MMHG | DIASTOLIC BLOOD PRESSURE: 69 MMHG | OXYGEN SATURATION: 98 %

## 2017-07-28 LAB
BASOPHILS # BLD AUTO: 0 10E9/L (ref 0–0.2)
BASOPHILS NFR BLD AUTO: 0.4 %
DIFFERENTIAL METHOD BLD: ABNORMAL
EOSINOPHIL # BLD AUTO: 0.3 10E9/L (ref 0–0.7)
EOSINOPHIL NFR BLD AUTO: 2.9 %
ERYTHROCYTE [DISTWIDTH] IN BLOOD BY AUTOMATED COUNT: 14.4 % (ref 10–15)
HCT VFR BLD AUTO: 34.5 % (ref 35–47)
HGB BLD-MCNC: 10.7 G/DL (ref 11.7–15.7)
IMM GRANULOCYTES # BLD: 0 10E9/L (ref 0–0.4)
IMM GRANULOCYTES NFR BLD: 0.2 %
LYMPHOCYTES # BLD AUTO: 4.1 10E9/L (ref 0.8–5.3)
LYMPHOCYTES NFR BLD AUTO: 37.5 %
MCH RBC QN AUTO: 24.8 PG (ref 26.5–33)
MCHC RBC AUTO-ENTMCNC: 31 G/DL (ref 31.5–36.5)
MCV RBC AUTO: 80 FL (ref 78–100)
MONOCYTES # BLD AUTO: 0.8 10E9/L (ref 0–1.3)
MONOCYTES NFR BLD AUTO: 7.6 %
NEUTROPHILS # BLD AUTO: 5.6 10E9/L (ref 1.6–8.3)
NEUTROPHILS NFR BLD AUTO: 51.4 %
NRBC # BLD AUTO: 0 10*3/UL
NRBC BLD AUTO-RTO: 0 /100
PLATELET # BLD AUTO: 305 10E9/L (ref 150–450)
RBC # BLD AUTO: 4.31 10E12/L (ref 3.8–5.2)
WBC # BLD AUTO: 10.9 10E9/L (ref 4–11)

## 2017-07-28 PROCEDURE — 36415 COLL VENOUS BLD VENIPUNCTURE: CPT | Performed by: EMERGENCY MEDICINE

## 2017-07-28 PROCEDURE — 74020 XR ABDOMEN 2 VW: CPT

## 2017-07-28 PROCEDURE — 85025 COMPLETE CBC W/AUTO DIFF WBC: CPT | Performed by: EMERGENCY MEDICINE

## 2017-07-28 RX ORDER — ONDANSETRON 4 MG/1
4 TABLET, ORALLY DISINTEGRATING ORAL EVERY 6 HOURS PRN
Qty: 12 TABLET | Refills: 0 | Status: SHIPPED | OUTPATIENT
Start: 2017-07-28 | End: 2017-07-31

## 2017-07-28 ASSESSMENT — ENCOUNTER SYMPTOMS
ABDOMINAL PAIN: 1
BACK PAIN: 1
FEVER: 1
HEADACHES: 1
VOMITING: 1
NAUSEA: 1

## 2017-07-28 NOTE — ED PROVIDER NOTES
History     Chief Complaint:  Back pain and abdominal pain    HPI     The patient does not speak English. Her friend interpreted for her to gather the HPI.     Kendra Vaughn is a 27 year old female who presents with back pain and abdominal pain. The patient was seen recently for similar symptoms on 7/19 (results from labs and imaging below). The patient states that she was told to come back with worsening symptoms, which is why she presents tonight. The patient states that she has worsening back pain and abdominal pain. She has had some associated vomiting, difficulty breathing, headaches, and a subjective fever. She states that she has pain everywhere. The patient also complains of leg pain. She has been waiting to get an MRI done for some time for this pain as it has been ongoing. The patient requested an MRI tonight.     Workup from 7/19/2017  Imaging:   US Pelvic Complete w Transvaginal & Abd/Pel Duplex Limited  Simple appearing bilateral ovarian cysts.  Report per radiology.      Laboratory:  Wet Prep: few PMN's seen.  No Trichomonas seen.  No clue cells seen. No yeast seen.  Chlamydia trachomatis: negative  Neisseria gonorrhea: negative    HCG qualitative: negative  UA: Clear straw urine     CMP: WNL (Creatinine 0.65)  CBC:  WBC 7.0, HGB 11.9,     TSH with free T4 reflex: 0.84    Allergies:  No known drug allergies.     Medications:    Naproxen  Indomethacin  Omeprazole  Albuterol  Flovent  Flonase  Singulair  Zyrtec  Claritin    Past Medical History:    Asthma  GERD    Past Surgical History:    History reviewed. No pertinent past surgical history.     Family History:    History reviewed. No pertinent family history.     Social History:  Marital Status:   Presents to the ED with friends  Tobacco Use: Never  Alcohol Use: no    Review of Systems   Constitutional: Positive for fever.   Gastrointestinal: Positive for abdominal pain, nausea and vomiting.   Musculoskeletal: Positive for back pain.    Neurological: Positive for headaches.   All other systems reviewed and are negative.    Physical Exam   First Vitals:  BP: 111/69  Pulse: 82  Temp: 99.2  F (37.3  C)  Resp: 18  Weight: 61.2 kg (135 lb)  SpO2: 98 %      Physical Exam  Nursing note and vitals reviewed.  Constitutional: Cooperative.   HENT:   Mouth/Throat: Mucous membranes are normal.   Cardiovascular: Normal rate, regular rhythm and normal heart sounds.  No murmur.  Pulmonary/Chest: Effort normal and breath sounds normal. No respiratory distress. No wheezes. No rales.   Abdominal: Soft. Normal appearance and bowel sounds are normal. No distension. There is no tenderness. There is no rigidity and no guarding.   Neurological: Alert. Strength and sensation in LE's normal.   Skin: Skin is warm and dry. No rash noted.   Psychiatric: Normal mood and affect.      Emergency Department Course     Imaging:  Abdominal XR, 2 Views (Preliminary)  Supine and upright views. The bowel gas pattern is unremarkable. No free air or air-fluid levels. Large amount of stool in the right colon. Small amount of stool in the remainder of the colon.    Radiographic findings were communicated with the patient who voiced understanding of the findings.    Laboratory:  CBC:  WBC 10.9, HGB 10.7 (L),     Emergency Department Course:  Nursing notes and vitals reviewed.  I performed an exam of the patient as documented above.  A peripheral IV was established. Blood was drawn from the patient. This was sent for laboratory testing, findings above.    The patient was sent for a abdominal XR while in the emergency department, findings above.  Findings and plan explained to the patient. Patient discharged home with instructions regarding supportive care, medications, and reasons to return. The importance of close follow-up was reviewed. The patient was prescribed zofran.    I personally reviewed the laboratory results with the patient and answered all related questions prior to  discharge.        Impression & Plan      Medical Decision Making:  Kendra Vaughn is a 27 year old female evaluated last week with the same sort of symptoms including back pain, headache, abdominal pain. She has a non-focal abdomen with normal vital signs today. No signs of concerning for an acute surgical process such as appendicitis, bowel obstruction. She has no focal tenderness in the low pelvis making ovarian torsion or ruptured ovarian cyst unlikely. She is not pregnant. Ultrasound and wet prep including vaginal smears were all unremarkable from last week's exam. Her white count remains normal. She does have a slight increase in her stool burden on x-ray otherwise nothing focal. I have recommended stool softener with miralax, increased fiber intake, and zofran as needed. She will follow up with her PCP as needed. In regards to her back pain, this is a chronic issue. No indication for MRI or other advanced imaging at this time.      Diagnosis:    ICD-10-CM    1. Abdominal pain, generalized R10.84    2. Chronic low back pain without sciatica, unspecified back pain laterality M54.5     G89.29        Disposition:  discharged to home    Discharge Medications:  New Prescriptions    ONDANSETRON (ZOFRAN ODT) 4 MG ODT TAB    Take 1 tablet (4 mg) by mouth every 6 hours as needed for nausea       Scribe disclosure:   I, Alessandro Ferreira, am serving as a scribe on 7/28/2017 at 12:00 AM to personally document services performed by Dr. Lacy based on my observations and the provider's statements to me.    [facility] EMERGENCY DEPARTMENT       Kulwant Lacy MD  07/28/17 0145       Kulwant Lacy MD  07/28/17 0146

## 2017-07-28 NOTE — ED NOTES
27-year-old female presents to the ER with complaints of back pain, abd pain and nausea. Denies pregnancy. States she has not been having diarrhea or vomiting.

## 2017-07-28 NOTE — DISCHARGE INSTRUCTIONS
Discharge Instructions  Abdominal Pain    Abdominal pain can be caused by many things. Your evaluation today does not show the exact cause for your pain. Your doctor today has decided that it is unlikely your pain is due to a life threatening problem, or a problem requiring surgery or hospital admission. Sometimes those problems cannot be found right away, so it is very important that you follow up as directed.  Sometimes only the changes which occur over time allow the cause of your pain to be found.    Return to the Emergency Department for a recheck in 8-12 hours if your pain continues.  If your pain gets worse, changes in location, or feels different, return to the Emergency Department right away.    ADULTS:  Return to the Emergency Department right away if:      You get an oral temperature above 102oF or as directed by your doctor.    You have blood in your stools (bright red or black, tarry stools).    You keep throwing up or can t drink liquids.    You see blood when you throw up.    You can t have a bowel movement or you can t pass gas.    Your stomach gets bloated or bigger.    Your skin or the whites of your eyes look yellow.    You faint.    You have bloody, frequent or painful urination.    You have new symptoms or anything that worries you.      MORE INFORMATION:    Appendicitis:  A possible cause of abdominal pain in any person who still has their appendix is acute appendicitis. Appendicitis is often hard to diagnose.  Testing does not always rule out early appendicitis or other causes of abdominal pain. Close follow-up with your doctor and re-evaluations may be needed to figure out the reason for your abdominal pain.    Follow-up:  It is very important that you make an appointment with your clinic and go to the appointment.  If you do not follow-up with your primary doctor, it may result in missing an important development which could result in permanent injury or disability and/or lasting pain.  If  "there is any problem keeping your appointment, call your doctor or return to the Emergency Department.    Medications:  Take your medications as directed by your doctor today.  Before using over-the-counter medications, ask your doctor and make sure to take the medications as directed.  If you have any questions about medications, ask your doctor.    Diet:  Resume your normal diet as much as possible, but do not eat fried, fatty or spicy foods while you have pain.  Do not drink alcohol or have caffeine.  Do not smoke tobacco.    Probiotics: If you have been given an antibiotic, you may want to also take a probiotic pill or eat yogurt with live cultures. Probiotics have \"good bacteria\" to help your intestines stay healthy. Studies have shown that probiotics help prevent diarrhea and other intestine problems (including C. diff infection) when you take antibiotics. You can buy these without a prescription in the pharmacy section of the store.     If you were given a prescription for medicine here today, be sure to read all of the information (including the package insert) that comes with your prescription.  This will include important information about the medicine, its side effects, and any warnings that you need to know about.  The pharmacist who fills the prescription can provide more information and answer questions you may have about the medicine.  If you have questions or concerns that the pharmacist cannot address, please call or return to the Emergency Department.     Remember that you can always come back to the Emergency Department if you are not able to see your regular doctor in the amount of time listed above, if you get any new symptoms, or if there is anything that worries you.       "